# Patient Record
Sex: FEMALE
[De-identification: names, ages, dates, MRNs, and addresses within clinical notes are randomized per-mention and may not be internally consistent; named-entity substitution may affect disease eponyms.]

---

## 2023-07-01 ENCOUNTER — RX ONLY (OUTPATIENT)
Age: 73
Setting detail: RX ONLY
End: 2023-07-01

## 2024-04-13 ENCOUNTER — APPOINTMENT (RX ONLY)
Dept: URBAN - METROPOLITAN AREA CLINIC 79 | Facility: CLINIC | Age: 74
Setting detail: DERMATOLOGY
End: 2024-04-13

## 2024-04-13 DIAGNOSIS — L71.8 OTHER ROSACEA: ICD-10-CM | Status: INADEQUATELY CONTROLLED

## 2024-04-13 PROCEDURE — ? ADDITIONAL NOTES

## 2024-04-13 PROCEDURE — ? COUNSELING

## 2024-04-13 PROCEDURE — 99202 OFFICE O/P NEW SF 15 MIN: CPT

## 2024-04-13 ASSESSMENT — LOCATION DETAILED DESCRIPTION DERM
LOCATION DETAILED: RIGHT INFERIOR CENTRAL MALAR CHEEK
LOCATION DETAILED: LEFT INFERIOR CENTRAL MALAR CHEEK

## 2024-04-13 ASSESSMENT — LOCATION SIMPLE DESCRIPTION DERM
LOCATION SIMPLE: LEFT CHEEK
LOCATION SIMPLE: RIGHT CHEEK

## 2024-04-13 ASSESSMENT — LOCATION ZONE DERM: LOCATION ZONE: FACE

## 2024-04-13 NOTE — PROCEDURE: ADDITIONAL NOTES
Render Risk Assessment In Note?: no
Additional Notes: Patient describes a history of broken blood vessels, red/flushed cheeks and increased sensitivity to skin care products/washes when used on the face. \\n\\nDiscussed with the patient that she has rosacea and her skin will be more sensitive in general to many products and sunlight. Discussed avoiding triggers such as excess heat/hot showers, sunlight, alcohol, etc. \\n\\nRecommended using OTC - Vanicream moisturizer, sunscreen and face wash products. She declines prescription treatment with metronidazole cream today.
Detail Level: Detailed